# Patient Record
Sex: MALE | Race: WHITE | NOT HISPANIC OR LATINO | URBAN - METROPOLITAN AREA
[De-identification: names, ages, dates, MRNs, and addresses within clinical notes are randomized per-mention and may not be internally consistent; named-entity substitution may affect disease eponyms.]

---

## 2020-12-16 ENCOUNTER — IMPORTED ENCOUNTER (OUTPATIENT)
Dept: URBAN - METROPOLITAN AREA CLINIC 50 | Facility: CLINIC | Age: 35
End: 2020-12-16

## 2021-01-19 ENCOUNTER — IMPORTED ENCOUNTER (OUTPATIENT)
Dept: URBAN - METROPOLITAN AREA CLINIC 50 | Facility: CLINIC | Age: 36
End: 2021-01-19

## 2021-02-23 ENCOUNTER — IMPORTED ENCOUNTER (OUTPATIENT)
Dept: URBAN - METROPOLITAN AREA CLINIC 50 | Facility: CLINIC | Age: 36
End: 2021-02-23

## 2021-04-18 ASSESSMENT — VISUAL ACUITY
OS_CC: 20/25 PUSH
OD_CC: 20/20
OD_CC: 20/25 PUSH
OS_CC: 20/20
OD_CC: J1+
OS_CC: J1+@ 14 IN
OD_CC: J1+@ 14 IN
OS_CC: J1+

## 2021-04-18 ASSESSMENT — TONOMETRY
OS_IOP_MMHG: 15
OD_IOP_MMHG: 14

## 2021-10-21 ENCOUNTER — PREPPED CHART (OUTPATIENT)
Dept: URBAN - METROPOLITAN AREA CLINIC 53 | Facility: CLINIC | Age: 36
End: 2021-10-21

## 2021-10-28 ENCOUNTER — CONTACT LENS FITTING (OUTPATIENT)
Dept: URBAN - METROPOLITAN AREA CLINIC 53 | Facility: CLINIC | Age: 36
End: 2021-10-28

## 2021-10-28 DIAGNOSIS — H52.13: ICD-10-CM

## 2021-10-28 PROCEDURE — CLF EW NO CL FIT, EW, NO ADJUSTMENT

## 2022-01-10 ENCOUNTER — ESTABLISHED PATIENT (OUTPATIENT)
Dept: URBAN - METROPOLITAN AREA CLINIC 53 | Facility: CLINIC | Age: 37
End: 2022-01-10

## 2022-01-10 DIAGNOSIS — E10.3292: ICD-10-CM

## 2022-01-10 DIAGNOSIS — Z01.01: ICD-10-CM

## 2022-01-10 DIAGNOSIS — H52.13: ICD-10-CM

## 2022-01-10 PROCEDURE — 92014 COMPRE OPH EXAM EST PT 1/>: CPT

## 2022-01-10 PROCEDURE — 92015 DETERMINE REFRACTIVE STATE: CPT

## 2022-01-10 PROCEDURE — 92134 CPTRZ OPH DX IMG PST SGM RTA: CPT

## 2022-01-10 ASSESSMENT — TONOMETRY
OS_IOP_MMHG: 15
OD_IOP_MMHG: 16

## 2022-01-10 ASSESSMENT — VISUAL ACUITY
OD_CC: 20/25-2/+2
OU_CC: 20/20-2
OU_CC: J1+
OS_CC: 20/25-1

## 2024-11-11 ENCOUNTER — OFFICE VISIT (OUTPATIENT)
Dept: FAMILY MEDICINE CLINIC | Facility: CLINIC | Age: 39
End: 2024-11-11
Payer: COMMERCIAL

## 2024-11-11 VITALS
OXYGEN SATURATION: 97 % | WEIGHT: 254.8 LBS | TEMPERATURE: 97.5 F | SYSTOLIC BLOOD PRESSURE: 108 MMHG | HEIGHT: 75 IN | DIASTOLIC BLOOD PRESSURE: 70 MMHG | HEART RATE: 76 BPM | BODY MASS INDEX: 31.68 KG/M2

## 2024-11-11 DIAGNOSIS — E78.01 FAMILIAL HYPERCHOLESTEROLEMIA: ICD-10-CM

## 2024-11-11 DIAGNOSIS — Z11.59 NEED FOR HEPATITIS C SCREENING TEST: ICD-10-CM

## 2024-11-11 DIAGNOSIS — Z00.00 ANNUAL PHYSICAL EXAM: ICD-10-CM

## 2024-11-11 DIAGNOSIS — F41.1 GENERALIZED ANXIETY DISORDER: ICD-10-CM

## 2024-11-11 DIAGNOSIS — E10.9 TYPE 1 DIABETES MELLITUS WITHOUT COMPLICATION (HCC): ICD-10-CM

## 2024-11-11 DIAGNOSIS — I10 ESSENTIAL HYPERTENSION: ICD-10-CM

## 2024-11-11 DIAGNOSIS — Z11.4 SCREENING FOR HIV (HUMAN IMMUNODEFICIENCY VIRUS): ICD-10-CM

## 2024-11-11 DIAGNOSIS — Z23 ENCOUNTER FOR IMMUNIZATION: Primary | ICD-10-CM

## 2024-11-11 DIAGNOSIS — Z76.89 ENCOUNTER TO ESTABLISH CARE: ICD-10-CM

## 2024-11-11 LAB
CREAT UR-MCNC: 195.5 MG/DL
LEFT EYE DIABETIC RETINOPATHY: NORMAL
LEFT EYE IMAGE QUALITY: NORMAL
LEFT EYE MACULAR EDEMA: NORMAL
LEFT EYE OTHER RETINOPATHY: NORMAL
MICROALBUMIN UR-MCNC: 9.2 MG/L
MICROALBUMIN/CREAT 24H UR: 5 MG/G CREATININE (ref 0–30)
RIGHT EYE DIABETIC RETINOPATHY: NORMAL
RIGHT EYE IMAGE QUALITY: NORMAL
RIGHT EYE MACULAR EDEMA: NORMAL
RIGHT EYE OTHER RETINOPATHY: NORMAL
SEVERITY (EYE EXAM): NORMAL
SL AMB POCT HEMOGLOBIN AIC: 8.3 (ref ?–6.5)

## 2024-11-11 PROCEDURE — 83036 HEMOGLOBIN GLYCOSYLATED A1C: CPT

## 2024-11-11 PROCEDURE — 82570 ASSAY OF URINE CREATININE: CPT

## 2024-11-11 PROCEDURE — 99385 PREV VISIT NEW AGE 18-39: CPT

## 2024-11-11 PROCEDURE — 99214 OFFICE O/P EST MOD 30 MIN: CPT

## 2024-11-11 PROCEDURE — 90471 IMMUNIZATION ADMIN: CPT

## 2024-11-11 PROCEDURE — 90656 IIV3 VACC NO PRSV 0.5 ML IM: CPT

## 2024-11-11 PROCEDURE — 82043 UR ALBUMIN QUANTITATIVE: CPT

## 2024-11-11 RX ORDER — INSULIN ASPART 100 [IU]/ML
3 INJECTION, SOLUTION INTRAVENOUS; SUBCUTANEOUS 2 TIMES DAILY PRN
Qty: 15 ML | Refills: 3 | Status: SHIPPED | OUTPATIENT
Start: 2024-11-11 | End: 2024-11-21 | Stop reason: SDUPTHER

## 2024-11-11 RX ORDER — ATORVASTATIN CALCIUM 40 MG/1
40 TABLET, FILM COATED ORAL DAILY
COMMUNITY
Start: 2017-08-10 | End: 2024-11-11 | Stop reason: SDUPTHER

## 2024-11-11 RX ORDER — LISINOPRIL 20 MG/1
20 TABLET ORAL DAILY
Qty: 90 TABLET | Refills: 3 | Status: SHIPPED | OUTPATIENT
Start: 2024-11-11 | End: 2024-11-21 | Stop reason: SDUPTHER

## 2024-11-11 RX ORDER — SERTRALINE HYDROCHLORIDE 100 MG/1
150 TABLET, FILM COATED ORAL DAILY
Qty: 90 TABLET | Refills: 3 | Status: SHIPPED | OUTPATIENT
Start: 2024-11-11 | End: 2024-11-21 | Stop reason: SDUPTHER

## 2024-11-11 RX ORDER — SERTRALINE HYDROCHLORIDE 100 MG/1
150 TABLET, FILM COATED ORAL DAILY
COMMUNITY
Start: 2008-05-10 | End: 2024-11-11 | Stop reason: SDUPTHER

## 2024-11-11 RX ORDER — INSULIN ASPART 100 [IU]/ML
INJECTION, SOLUTION INTRAVENOUS; SUBCUTANEOUS
COMMUNITY
Start: 2014-04-10 | End: 2024-11-15 | Stop reason: SDUPTHER

## 2024-11-11 RX ORDER — ASPIRIN 81 MG/1
81 TABLET, CHEWABLE ORAL ONCE
COMMUNITY
End: 2024-11-11 | Stop reason: SDUPTHER

## 2024-11-11 RX ORDER — ASPIRIN 81 MG/1
81 TABLET, CHEWABLE ORAL ONCE
Qty: 1 TABLET | Refills: 0 | Status: SHIPPED | OUTPATIENT
Start: 2024-11-11 | End: 2024-11-21 | Stop reason: SDUPTHER

## 2024-11-11 RX ORDER — INSULIN ASPART 100 [IU]/ML
INJECTION, SOLUTION INTRAVENOUS; SUBCUTANEOUS
COMMUNITY
Start: 2015-04-10 | End: 2024-11-11 | Stop reason: SDUPTHER

## 2024-11-11 RX ORDER — ATORVASTATIN CALCIUM 40 MG/1
40 TABLET, FILM COATED ORAL DAILY
Qty: 90 TABLET | Refills: 3 | Status: SHIPPED | OUTPATIENT
Start: 2024-11-11 | End: 2024-11-21 | Stop reason: SDUPTHER

## 2024-11-11 RX ORDER — LISINOPRIL 20 MG/1
20 TABLET ORAL DAILY
COMMUNITY
Start: 2024-10-04 | End: 2024-11-11 | Stop reason: SDUPTHER

## 2024-11-11 NOTE — ASSESSMENT & PLAN NOTE
-Refilled Lipitor 40mg daily  -Ordered lipid panel    Orders:    atorvastatin (LIPITOR) 40 mg tablet; Take 1 tablet (40 mg total) by mouth daily    Lipid panel; Future

## 2024-11-11 NOTE — ASSESSMENT & PLAN NOTE
-Last A1C a year ago was 9  -A1C today 8.3   -On insulin pump, has 2-3 weeks left. Placed STAT referral to endocrine and clinical pharmacy   -Refilled 100 units Novolog flex pen PRN   -Refilled ASA 81 mg daily   -DM foot exam, eye exam, and urine alb/creat done at todays visit   Lab Results   Component Value Date    HGBA1C 8.3 (A) 11/11/2024       Orders:    aspirin 81 mg chewable tablet; Chew 1 tablet (81 mg total) once for 1 dose    insulin aspart (NovoLOG FlexPen) 100 UNIT/ML injection pen; Inject 3 Units under the skin 2 (two) times a day as needed for high blood sugar    Ambulatory referral to clinical pharmacy; Future    Ambulatory Referral to Endocrinology; Future    IRIS Diabetic eye exam    POCT hemoglobin A1c    Albumin / creatinine urine ratio; Future    Albumin / creatinine urine ratio

## 2024-11-11 NOTE — PROGRESS NOTES
Diabetic Foot Exam    Patient's shoes and socks removed.    Right Foot/Ankle   Right Foot Inspection  Skin Exam: skin normal and skin intact. No dry skin, no warmth, no callus, no erythema, no maceration, no abnormal color, no pre-ulcer, no ulcer and no callus.     Toe Exam: ROM and strength within normal limits.     Sensory   Vibration: intact  Proprioception: intact  Monofilament testing: intact    Vascular  Capillary refills: < 3 seconds  The right DP pulse is 2+. The right PT pulse is 2+.     Right Toe  - Comprehensive Exam  Ecchymosis: none  Arch: normal  Hammertoes: absent  Claw Toes: absent  Swelling: none   Tenderness: none       Left Foot/Ankle  Left Foot Inspection  Skin Exam: skin normal and skin intact. No dry skin, no warmth, no erythema, no maceration, normal color, no pre-ulcer, no ulcer and no callus.     Toe Exam: ROM and strength within normal limits.     Sensory   Vibration: intact  Proprioception: intact  Monofilament testing: intact    Vascular  Capillary refills: < 3 seconds  The left DP pulse is 2+. The left PT pulse is 2+.     Left Toe  - Comprehensive Exam  Ecchymosis: none  Arch: normal  Hammertoes: absent  Claw toes: absent  Swelling: none   Tenderness: none       Assign Risk Category  No deformity present  No loss of protective sensation  No weak pulses  Risk: 0

## 2024-11-11 NOTE — PATIENT INSTRUCTIONS
"Patient Education     Routine physical for adults   The Basics   Written by the doctors and editors at [facility]   What is a physical? -- A physical is a routine visit, or \"check-up,\" with your doctor. You might also hear it called a \"wellness visit\" or \"preventive visit.\"  During each visit, the doctor will:   Ask about your physical and mental health   Ask about your habits, behaviors, and lifestyle   Do an exam   Give you vaccines if needed   Talk to you about any medicines you take   Give advice about your health   Answer your questions  Getting regular check-ups is an important part of taking care of your health. It can help your doctor find and treat any problems you have. But it's also important for preventing health problems.  A routine physical is different from a \"sick visit.\" A sick visit is when you see a doctor because of a health concern or problem. Since physicals are scheduled ahead of time, you can think about what you want to ask the doctor.  How often should I get a physical? -- It depends on your age and health. In general, for people age 21 years and older:   If you are younger than 50 years, you might be able to get a physical every 3 years.   If you are 50 years or older, your doctor might recommend a physical every year.  If you have an ongoing health condition, like diabetes or high blood pressure, your doctor will probably want to see you more often.  What happens during a physical? -- In general, each visit will include:   Physical exam - The doctor or nurse will check your height, weight, heart rate, and blood pressure. They will also look at your eyes and ears. They will ask about how you are feeling and whether you have any symptoms that bother you.   Medicines - It's a good idea to bring a list of all the medicines you take to each doctor visit. Your doctor will talk to you about your medicines and answer any questions. Tell them if you are having any side effects that bother you. You " "should also tell them if you are having trouble paying for any of your medicines.   Habits and behaviors - This includes:   Your diet   Your exercise habits   Whether you smoke, drink alcohol, or use drugs   Whether you are sexually active   Whether you feel safe at home  Your doctor will talk to you about things you can do to improve your health and lower your risk of health problems. They will also offer help and support. For example, if you want to quit smoking, they can give you advice and might prescribe medicines. If you want to improve your diet or get more physical activity, they can help you with this, too.   Lab tests, if needed - The tests you get will depend on your age and situation. For example, your doctor might want to check your:   Cholesterol   Blood sugar   Iron level   Vaccines - The recommended vaccines will depend on your age, health, and what vaccines you already had. Vaccines are very important because they can prevent certain serious or deadly infections.   Discussion of screening - \"Screening\" means checking for diseases or other health problems before they cause symptoms. Your doctor can recommend screening based on your age, risk, and preferences. This might include tests to check for:   Cancer, such as breast, prostate, cervical, ovarian, colorectal, prostate, lung, or skin cancer   Sexually transmitted infections, such as chlamydia and gonorrhea   Mental health conditions like depression and anxiety  Your doctor will talk to you about the different types of screening tests. They can help you decide which screenings to have. They can also explain what the results might mean.   Answering questions - The physical is a good time to ask the doctor or nurse questions about your health. If needed, they can refer you to other doctors or specialists, too.  Adults older than 65 years often need other care, too. As you get older, your doctor will talk to you about:   How to prevent falling at " home   Hearing or vision tests   Memory testing   How to take your medicines safely   Making sure that you have the help and support you need at home  All topics are updated as new evidence becomes available and our peer review process is complete.  This topic retrieved from [employer] on: May 02, 2024.  Topic 646243 Version 1.0  Release: 32.4.3 - C32.122  © 2024 UpToDate, Inc. and/or its affiliates. All rights reserved.  Consumer Information Use and Disclaimer   Disclaimer: This generalized information is a limited summary of diagnosis, treatment, and/or medication information. It is not meant to be comprehensive and should be used as a tool to help the user understand and/or assess potential diagnostic and treatment options. It does NOT include all information about conditions, treatments, medications, side effects, or risks that may apply to a specific patient. It is not intended to be medical advice or a substitute for the medical advice, diagnosis, or treatment of a health care provider based on the health care provider's examination and assessment of a patient's specific and unique circumstances. Patients must speak with a health care provider for complete information about their health, medical questions, and treatment options, including any risks or benefits regarding use of medications. This information does not endorse any treatments or medications as safe, effective, or approved for treating a specific patient. UpToDate, Inc. and its affiliates disclaim any warranty or liability relating to this information or the use thereof.The use of this information is governed by the Terms of Use, available at https://www.[employer]ingu[employer].com/en/know/clinical-effectiveness-terms. 2024© UpToDate, Inc. and its affiliates and/or licensors. All rights reserved.  Copyright   © 2024 UpToDate, Inc. and/or its affiliates. All rights reserved.

## 2024-11-11 NOTE — ASSESSMENT & PLAN NOTE
-Currently taking 150 mg Zoloft daily  -States anxiety is well controlled. No concerns at this visit   -Not interested in talk therapy at this time   Orders:    sertraline (ZOLOFT) 100 mg tablet; Take 1.5 tablets (150 mg total) by mouth daily

## 2024-11-11 NOTE — PROGRESS NOTES
Adult Annual Physical  Name: Leeroy Corral      : 1985      MRN: [id number]  Encounter Provider: Cynthia Fraser PA-C  Encounter Date: 2024   Encounter department: [facility] [address]    Assessment & Plan  Annual physical exam  -Moved back to area from [place]  -Needs med refill   -No concerns at this visit     Orders:    CBC and differential; Future    Comprehensive metabolic panel; Future    Type 1 diabetes mellitus without complication (HCC)  -Last A1C a year ago was 9  -A1C today 8.3   -On insulin pump, has 2-3 weeks left. Placed STAT referral to endocrine and clinical pharmacy   -Refilled 100 units Novolog flex pen PRN   -Refilled ASA 81 mg daily   -DM foot exam, eye exam, and urine alb/creat done at todays visit   Lab Results   Component Value Date    HGBA1C 8.3 (A) 2024       Orders:    aspirin 81 mg chewable tablet; Chew 1 tablet (81 mg total) once for 1 dose    insulin aspart (NovoLOG FlexPen) 100 UNIT/ML injection pen; Inject 3 Units under the skin 2 (two) times a day as needed for high blood sugar    Ambulatory referral to clinical pharmacy; Future    Ambulatory Referral to Endocrinology; Future    IRIS Diabetic eye exam    POCT hemoglobin A1c    Albumin / creatinine urine ratio; Future    Albumin / creatinine urine ratio    Essential hypertension  -Refilled 20 mg lisinopril  -BP in office 108/70     Orders:    lisinopril (ZESTRIL) 20 mg tablet; Take 1 tablet (20 mg total) by mouth daily    Generalized anxiety disorder  -Currently taking 150 mg Zoloft daily  -States anxiety is well controlled. No concerns at this visit   -Not interested in talk therapy at this time   Orders:    sertraline (ZOLOFT) 100 mg tablet; Take 1.5 tablets (150 mg total) by mouth daily    Familial hypercholesterolemia  -Refilled Lipitor 40mg daily  -Ordered lipid panel    Orders:    atorvastatin (LIPITOR) 40 mg tablet; Take 1 tablet (40 mg total) by mouth daily    Lipid  panel; Future    Encounter for immunization    Orders:    influenza vaccine preservative-free 0.5 mL IM (Fluzone, Afluria, Fluarix, Flulaval)    Encounter to establish care         Screening for HIV (human immunodeficiency virus)    Orders:    HIV 1/2 AG/AB w Reflex [facility] for 2 yr old and above; Future    Need for hepatitis C screening test    Orders:    Hepatitis C Antibody; Future      Immunizations and preventive care screenings were discussed with patient today. Appropriate education was printed on patient's after visit summary.    Counseling:  Alcohol/drug use: discussed moderation in alcohol intake, the recommendations for healthy alcohol use, and avoidance of illicit drug use.  Dental Health: discussed importance of regular tooth brushing, flossing, and dental visits.  Injury prevention: discussed safety/seat belts, safety helmets, smoke detectors, carbon monoxide detectors, and smoking near bedding or upholstery.  Sexual health: discussed sexually transmitted diseases, partner selection, use of condoms, avoidance of unintended pregnancy, and contraceptive alternatives.  Exercise: the importance of regular exercise/physical activity was discussed. Recommend exercise 3-5 times per week for at least 30 minutes.       Depression Screening and Follow-up Plan: Patient was screened for depression during today's encounter. They screened negative with a PHQ-2 score of 0.        History of Present Illness     Adult Annual Physical:  Patient presents for annual physical. He recently moved back from [place] .     Diet and Physical Activity:  - Diet/Nutrition: well balanced diet and frequent junk food. States he frequently has fast food for lunch  - Exercise: walking and 5-7 times a week on average.    Depression Screening:  - PHQ-2 Score: 0    General Health:  - Sleep: sleeps well. 6-8 hours per night  - Hearing: normal hearing bilateral ears.  - Vision: wears glasses and contacts and most recent eye exam > 1 year ago.  "Scheduling with [facility]  - Dental: no dental visits for > 1 year and brushes teeth twice daily. Flosses intermittently. Scheduling appt with Dr Meyer today     Health:  - History of STDs: no.   - Urinary symptoms: none.     Advanced Care Planning:  - Has an advanced directive?: no    - Has a durable medical POA?: no    - ACP document given to patient?: yes          Review of Systems   Constitutional:  Negative for chills, fatigue and fever.   HENT:  Negative for congestion, ear pain, rhinorrhea, sinus pain and sore throat.    Eyes:  Negative for pain.   Respiratory:  Negative for cough and shortness of breath.    Cardiovascular:  Negative for chest pain and leg swelling.   Gastrointestinal:  Negative for abdominal pain, constipation, diarrhea, nausea and vomiting.   Genitourinary:  Negative for difficulty urinating, dysuria, frequency and urgency.   Musculoskeletal:  Negative for neck pain.   Skin:  Negative for rash.   Neurological:  Negative for dizziness and headaches.   Psychiatric/Behavioral:  Negative for sleep disturbance.              Objective     /70   Pulse 76   Temp 97.5 °F (36.4 °C) (Tympanic)   Ht 6' 3\" (1.905 m)   Wt 116 kg (254 lb 12.8 oz)   SpO2 97%   BMI 31.85 kg/m²     Physical Exam  Vitals reviewed.   Constitutional:       General: He is not in acute distress.     Appearance: Normal appearance.   HENT:      Head: Normocephalic and atraumatic.      Right Ear: Tympanic membrane, ear canal and external ear normal.      Left Ear: Tympanic membrane, ear canal and external ear normal.      Nose: Nose normal.      Mouth/Throat:      Mouth: Mucous membranes are moist.   Eyes:      Extraocular Movements: Extraocular movements intact.      Conjunctiva/sclera: Conjunctivae normal.   Cardiovascular:      Rate and Rhythm: Normal rate and regular rhythm.      Pulses: no weak pulses.           Dorsalis pedis pulses are 2+ on the right side and 2+ on the left side.        Posterior " tibial pulses are 2+ on the right side and 2+ on the left side.      Heart sounds: Normal heart sounds.   Pulmonary:      Effort: Pulmonary effort is normal.      Breath sounds: Normal breath sounds. No wheezing, rhonchi or rales.   Abdominal:      General: Bowel sounds are normal. There is no distension.      Palpations: Abdomen is soft.      Tenderness: There is no abdominal tenderness.   Musculoskeletal:      Cervical back: Neck supple.      Right lower leg: No edema.      Left lower leg: No edema.   Feet:      Right foot:      Skin integrity: No ulcer, skin breakdown, erythema, warmth, callus or dry skin.      Left foot:      Skin integrity: No ulcer, skin breakdown, erythema, warmth, callus or dry skin.   Lymphadenopathy:      Cervical: No cervical adenopathy.   Skin:     General: Skin is warm.      Capillary Refill: Capillary refill takes less than 2 seconds.      Findings: No rash.   Neurological:      Mental Status: He is alert. Mental status is at baseline.   Psychiatric:         Mood and Affect: Mood normal.         Behavior: Behavior normal.     Diabetic Foot Exam    Patient's shoes and socks removed.    Right Foot/Ankle   Right Foot Inspection  Skin Exam: skin normal and skin intact. No dry skin, no warmth, no callus, no erythema, no maceration, no abnormal color, no pre-ulcer, no ulcer and no callus.     Toe Exam: ROM and strength within normal limits.     Sensory   Vibration: intact  Proprioception: intact  Monofilament testing: intact    Vascular  Capillary refills: < 3 seconds  The right DP pulse is 2+. The right PT pulse is 2+.     Left Foot/Ankle  Left Foot Inspection  Skin Exam: skin normal and skin intact. No dry skin, no warmth, no erythema, no maceration, normal color, no pre-ulcer, no ulcer and no callus.     Toe Exam: ROM and strength within normal limits.     Sensory   Vibration: intact  Proprioception: intact  Monofilament testing: intact    Vascular  Capillary refills: < 3 seconds  The  left DP pulse is 2+. The left PT pulse is 2+.     Assign Risk Category  No deformity present  No loss of protective sensation  No weak pulses  Risk: 0              Cynthia Fraser PA-C  [facility]  11/11/2024 10:14 AM     Additional Notes: nodulocystic and scarring acne that is improving after 2 months of isotretinoin but not at treatment goal and complicated by worsening headaches when dose increased from 40 mg daily to 40 mg BID. headaches resolved when decreased back down to 40 mg daily\\n\\nwill increase to 60 mg daily via alternate day dosing (he has about 2 weeks of medication remaining from last month due to dose adjustment). will plan to check in via short phone call in 2 weeks to see how tolerating and instructed father to contact me (he has my cell phone number) if headaches return with dose increase. if tolerating 60 mg daily, will plan to continue at that dose for the remainder of the treatment course\\n\\nprescription in January called into [facility] pharmacy as mother works there and other commercial pharmacy was cost-prohibitive. can use [facility] pharmacy in future or send to [facility] if needed Additional Notes: nodulocystic and scarring acne that is improving after 2 months of isotretinoin but not at treatment goal and complicated by worsening headaches when dose increased from 40 mg daily to 40 mg BID. headaches resolved when decreased back down to 40 mg daily\\n\\nwill increase to 60 mg daily via alternate day dosing (he has about 2 weeks of medication remaining from last month due to dose adjustment). will plan to check in via short phone call in 2 weeks to see how tolerating and instructed father to contact me (he has my cell phone number) if headaches return with dose increase. if tolerating 60 mg daily, will plan to continue at that dose for the remainder of the treatment course\\n\\nprescription in January called into [facility] pharmacy as mother works there and other commercial pharmacy was cost-prohibitive. can use Replaced by [facility] pharmacy in future or send to [facility] if needed

## 2024-11-11 NOTE — ASSESSMENT & PLAN NOTE
-Refilled 20 mg lisinopril  -BP in office 108/70     Orders:    lisinopril (ZESTRIL) 20 mg tablet; Take 1 tablet (20 mg total) by mouth daily

## 2024-11-12 ENCOUNTER — TELEPHONE (OUTPATIENT)
Dept: FAMILY MEDICINE CLINIC | Facility: CLINIC | Age: 39
End: 2024-11-12

## 2024-11-12 NOTE — TELEPHONE ENCOUNTER
Please contact patient to schedule Clinical Pharmacist Appointment     Reason for appointment: diabetes  When to schedule with Pharmacist: as soon as available  What should the patient bring to the appointment: med list and glucose log    Appointment Department:  [facility]  Pharmacist patient will be seeing: Aysha Story  Visit Type Preference (ie Phone, Video, In-person): VIRTUAL  In-person visits will be at: [facility]  Virtual visits will be completed via [employer] or Phone - please clarify patient preference in appointment notes    Please respond to this note to keep track of the number of patient outreaches.     Please try to reach out to patient on 2 separate days

## 2024-11-12 NOTE — TELEPHONE ENCOUNTER
Pt returned call back, scheduled.       NEXT APPT  With Family Medicine (Liz Deleon, Pharmacist)  11/15/2024 at 10:00 AM

## 2024-11-15 ENCOUNTER — CLINICAL SUPPORT (OUTPATIENT)
Dept: FAMILY MEDICINE CLINIC | Facility: CLINIC | Age: 39
End: 2024-11-15

## 2024-11-15 DIAGNOSIS — E78.01 FAMILIAL HYPERCHOLESTEROLEMIA: ICD-10-CM

## 2024-11-15 DIAGNOSIS — E10.9 TYPE 1 DIABETES MELLITUS WITHOUT COMPLICATION (HCC): Primary | ICD-10-CM

## 2024-11-15 DIAGNOSIS — I10 ESSENTIAL HYPERTENSION: ICD-10-CM

## 2024-11-15 PROCEDURE — PBNCHG PB NO CHARGE PLACEHOLDER: Performed by: PHARMACIST

## 2024-11-15 RX ORDER — INSULIN ASPART 100 [IU]/ML
INJECTION, SOLUTION INTRAVENOUS; SUBCUTANEOUS
Qty: 90 ML | Refills: 1 | Status: SHIPPED | OUTPATIENT
Start: 2024-11-15

## 2024-11-15 NOTE — Clinical Note
I spoke with Francisco J today. I sent refills on his Novolog vials to be used with his pump and test strips. We should be getting a form faxed to the office for refills on his pump/CGM supplies but he has a few months of those at home so less urgent. He does have appt with endo scheduled but the soonest they could get him in was end of Feb so we will need to order his supplies to bridge that gap. Let me know if we get the form from [employer] and I can help with filling it out.

## 2024-11-15 NOTE — PROGRESS NOTES
[facility] Clinical Pharmacy Services  Liz Deleon, Pharmacist    Assessment/ Plan     Assessment & Plan  Type 1 diabetes mellitus without complication (HCC)    Lab Results   Component Value Date    HGBA1C 8.3 (A) 11/11/2024       Insulin pump system: Medtronics MiniMed 780G System  Insulin used in pump: Novolog vial U-100 (3 vials ~28 day supply)  CGM: [employer] Guardian   Fingerstick glucometer: Accuchek Guide     Pump settings:  Carb ratio: 4 grams per unit  IS: 20 mg/dL/ unit   7 day average total daily insulin amount: 105 units     Assessment:  Patient moved back to [place] from [place] and is establishing care with PCP and Endo in [place].  He had a visit with his primary care provider Cynthia Fraser PA-C on November 11.  He has an appointment scheduled with endo for earliest date of Feb 2025 but he is on a wait list for sooner appointment. At this time he needs Novolog vials for use with his pump sent to his pharmacy.     He thinks he has enough pump supplies to get him through until his Endo appointment.  He has some CGM supplies but will likely need a refill before Endo.  He gets his supplies filled through [employer] and they are supposed to be faxing a form to our office for insulin pump and CGM supply refills.     He needs refills for his test strips. He likes to have extra Novolog pens at home in case pump falls off early.       Changes to medication regimen:  Refill sent for Novolog vials and test strips  Will assist with filling out pump / CGM supply form when he receive it.      Orders:    Ambulatory referral to clinical pharmacy    glucose blood (Accu-Chek Guide) test strip; Use to test blood sugar up to 4 times daily. Accu-chek Guide test strips    Insulin Aspart (NovoLOG) 100 units/mL injection; For use with insulin pump. Total daily dose 105 units.      Subjective   HPI    Medication Adherence/ Tolerability/ Cost:  atorvastatin  insulin aspart  Pens- uses prn if pump falls off  Vials-  uses with Medtronic pump  lisinopril  sertraline    2. Home monitoring devices  Glucometer: Yes, Brand: Accu-chek guide  Continuous Glucose Monitor: Yes, Brand: Medtronic guardian     Objective       Blood Sugar Readings  Had medtronic Guardian CGM    Glucose Range   Lowest: 63 mg/dL  Highest: 270 mg/dL   Average: 141 mg/dL  Time in target: 74%    ASCVD Risk:  The ASCVD Risk score (Jet BISHOP, et al., 2019) failed to calculate for the following reasons:    The 2019 ASCVD risk score is only valid for ages 40 to 79     Vitals:  There were no vitals filed for this visit.    Eye Exam:    Lab Results   Component Value Date    LEFTDIABRET None 11/11/2024    RIGHTDIABRET None 11/11/2024       Labs:  Lab Results   Component Value Date    MICROALBCRE 5 11/11/2024       Lab Results   Component Value Date    HGBA1C 8.3 (A) 11/11/2024       Telemedicine consent  The patient was identified by name and date of birth. Leeroy Corral was informed that this is a telemedicine visit and that the visit is being conducted through the Epic Embedded platform. He agrees to proceed..  My office door was closed. No one else was in the room.  He acknowledged consent and understanding of privacy and security of the video platform. The patient has agreed to participate and understands they can discontinue the visit at any time.    Pharmacist Tracking Tool     Pharmacist Tracking Tool  Reason For Outreach: Embedded Pharmacist  Demographics:  Intervention Method: Video  Type of Intervention: New  Topics Addressed: Diabetes  Pharmacologic Interventions: Med Rec  Non-Pharmacologic Interventions: N/A  Time:  Direct Patient Care:  30  mins  Care Coordination:  20  mins  Recommendation Recipient: Patient/Caregiver and Provider  Outcome: Accepted

## 2024-11-15 NOTE — ASSESSMENT & PLAN NOTE
Lab Results   Component Value Date    HGBA1C 8.3 (A) 11/11/2024       Insulin pump system: [employer]blar MiniMed 780G System  Insulin used in pump: Novolog vial U-100 (3 vials ~28 day supply)  CGM: [employer]   Fingerstick glucometer: [employer]hek Guide     Pump settings:  Carb ratio: 4 grams per unit  IS: 20 mg/dL/ unit   7 day average total daily insulin amount: 105 units     Assessment:  Patient moved back to [place] from [place] and is establishing care with PCP and Endo in [place].  He had a visit with his primary care provider Cynthia Fraser PA-C on November 11.  He has an appointment scheduled with endo for earliest date of Feb 2025 but he is on a wait list for sooner appointment. At this time he needs Novolog vials for use with his pump sent to his pharmacy.     He thinks he has enough pump supplies to get him through until his Endo appointment.  He has some CGM supplies but will likely need a refill before Endo.  He gets his supplies filled through [employer] and they are supposed to be faxing a form to our office for insulin pump and CGM supply refills.     He needs refills for his test strips. He likes to have extra Novolog pens at home in case pump falls off early.       Changes to medication regimen:  Refill sent for Novolog vials and test strips  Will assist with filling out pump / CGM supply form when he receive it.      Orders:    Ambulatory referral to clinical pharmacy    glucose blood (Accu-Chek Guide) test strip; Use to test blood sugar up to 4 times daily. Accu-chek Guide test strips    Insulin Aspart (NovoLOG) 100 units/mL injection; For use with insulin pump. Total daily dose 105 units.

## 2024-11-21 ENCOUNTER — TELEPHONE (OUTPATIENT)
Dept: FAMILY MEDICINE CLINIC | Facility: CLINIC | Age: 39
End: 2024-11-21

## 2024-11-21 DIAGNOSIS — F41.1 GENERALIZED ANXIETY DISORDER: ICD-10-CM

## 2024-11-21 DIAGNOSIS — E10.9 TYPE 1 DIABETES MELLITUS WITHOUT COMPLICATION (HCC): ICD-10-CM

## 2024-11-21 DIAGNOSIS — I10 ESSENTIAL HYPERTENSION: ICD-10-CM

## 2024-11-21 DIAGNOSIS — E78.01 FAMILIAL HYPERCHOLESTEROLEMIA: ICD-10-CM

## 2024-11-21 RX ORDER — SERTRALINE HYDROCHLORIDE 100 MG/1
150 TABLET, FILM COATED ORAL DAILY
Qty: 90 TABLET | Refills: 3 | Status: SHIPPED | OUTPATIENT
Start: 2024-11-21

## 2024-11-21 RX ORDER — LISINOPRIL 20 MG/1
20 TABLET ORAL DAILY
Qty: 90 TABLET | Refills: 3 | Status: SHIPPED | OUTPATIENT
Start: 2024-11-21

## 2024-11-21 RX ORDER — INSULIN ASPART 100 [IU]/ML
3 INJECTION, SOLUTION INTRAVENOUS; SUBCUTANEOUS 2 TIMES DAILY PRN
Qty: 15 ML | Refills: 3 | Status: SHIPPED | OUTPATIENT
Start: 2024-11-21

## 2024-11-21 RX ORDER — ATORVASTATIN CALCIUM 40 MG/1
40 TABLET, FILM COATED ORAL DAILY
Qty: 90 TABLET | Refills: 3 | Status: SHIPPED | OUTPATIENT
Start: 2024-11-21

## 2024-11-21 RX ORDER — LISINOPRIL 20 MG/1
20 TABLET ORAL DAILY
Qty: 90 TABLET | Refills: 3 | Status: SHIPPED | OUTPATIENT
Start: 2024-11-21 | End: 2024-11-21 | Stop reason: SDUPTHER

## 2024-11-21 RX ORDER — ASPIRIN 81 MG/1
81 TABLET, CHEWABLE ORAL ONCE
Qty: 1 TABLET | Refills: 0 | Status: SHIPPED | OUTPATIENT
Start: 2024-11-21 | End: 2024-11-21

## 2024-11-21 RX ORDER — ASPIRIN 81 MG/1
81 TABLET, CHEWABLE ORAL ONCE
Qty: 1 TABLET | Refills: 0 | Status: SHIPPED | OUTPATIENT
Start: 2024-11-21 | End: 2024-11-21 | Stop reason: SDUPTHER

## 2024-11-21 RX ORDER — ATORVASTATIN CALCIUM 40 MG/1
40 TABLET, FILM COATED ORAL DAILY
Qty: 90 TABLET | Refills: 3 | Status: SHIPPED | OUTPATIENT
Start: 2024-11-21 | End: 2024-11-21 | Stop reason: SDUPTHER

## 2024-11-21 NOTE — TELEPHONE ENCOUNTER
"VM transcript \"Hi Liz, this is Leeroy SAUCEDO with my birthday April 17th, 1985. We had spoke yesterday or the day before about my insulin and supplies for my insulin pump. I was going to try to set up [facility] it I'm having a hard time with that, but the [facility] ones went through perfect. So I think we'll just stick with [facility]. We won't even bother with [facility] and I confirmed with my pump supplier [employer] that they are shipping that or they are updating that form. We'll send it over to you. So if you have any questions, please call me back. If not, thank you so much. I appreciate your help. Bye\"     Scripts sent to [facility] as requested per CPA agreement  "

## 2024-12-13 DIAGNOSIS — F41.1 GENERALIZED ANXIETY DISORDER: ICD-10-CM

## 2024-12-13 RX ORDER — SERTRALINE HYDROCHLORIDE 100 MG/1
TABLET, FILM COATED ORAL
Qty: 135 TABLET | Refills: 1 | Status: SHIPPED | OUTPATIENT
Start: 2024-12-13

## 2025-01-24 ENCOUNTER — TELEPHONE (OUTPATIENT)
Dept: FAMILY MEDICINE CLINIC | Facility: CLINIC | Age: 40
End: 2025-01-24

## 2025-01-24 NOTE — TELEPHONE ENCOUNTER
[employer] supplies called to make sure we received the paperwork that needs to be filled out and faxed back to them.    CB: [phone]

## 2025-01-29 ENCOUNTER — APPOINTMENT (OUTPATIENT)
Age: 40
End: 2025-01-29
Payer: COMMERCIAL

## 2025-01-29 DIAGNOSIS — E78.01 FAMILIAL HYPERCHOLESTEROLEMIA: ICD-10-CM

## 2025-01-29 DIAGNOSIS — Z11.59 NEED FOR HEPATITIS C SCREENING TEST: ICD-10-CM

## 2025-01-29 DIAGNOSIS — Z11.4 SCREENING FOR HIV (HUMAN IMMUNODEFICIENCY VIRUS): ICD-10-CM

## 2025-01-29 DIAGNOSIS — Z00.00 ANNUAL PHYSICAL EXAM: ICD-10-CM

## 2025-01-29 DIAGNOSIS — E10.9 TYPE 1 DIABETES MELLITUS WITHOUT COMPLICATION (HCC): ICD-10-CM

## 2025-01-29 LAB
ALBUMIN SERPL BCG-MCNC: 3.9 G/DL (ref 3.5–5)
ALP SERPL-CCNC: 101 U/L (ref 34–104)
ALT SERPL W P-5'-P-CCNC: 24 U/L (ref 7–52)
ANION GAP SERPL CALCULATED.3IONS-SCNC: 12 MMOL/L (ref 4–13)
AST SERPL W P-5'-P-CCNC: 23 U/L (ref 13–39)
BASOPHILS # BLD AUTO: 0.02 THOUSANDS/ΜL (ref 0–0.1)
BASOPHILS NFR BLD AUTO: 0 % (ref 0–1)
BILIRUB SERPL-MCNC: 0.45 MG/DL (ref 0.2–1)
BUN SERPL-MCNC: 12 MG/DL (ref 5–25)
CALCIUM SERPL-MCNC: 8.9 MG/DL (ref 8.4–10.2)
CHLORIDE SERPL-SCNC: 105 MMOL/L (ref 96–108)
CHOLEST SERPL-MCNC: 106 MG/DL (ref ?–200)
CO2 SERPL-SCNC: 24 MMOL/L (ref 21–32)
CREAT SERPL-MCNC: 0.76 MG/DL (ref 0.6–1.3)
EOSINOPHIL # BLD AUTO: 0.19 THOUSAND/ΜL (ref 0–0.61)
EOSINOPHIL NFR BLD AUTO: 3 % (ref 0–6)
ERYTHROCYTE [DISTWIDTH] IN BLOOD BY AUTOMATED COUNT: 12.2 % (ref 11.6–15.1)
EST. AVERAGE GLUCOSE BLD GHB EST-MCNC: 206 MG/DL
GFR SERPL CREATININE-BSD FRML MDRD: 115 ML/MIN/1.73SQ M
GLUCOSE P FAST SERPL-MCNC: 103 MG/DL (ref 65–99)
HBA1C MFR BLD: 8.8 %
HCT VFR BLD AUTO: 40.7 % (ref 36.5–49.3)
HCV AB SER QL: NORMAL
HDLC SERPL-MCNC: 35 MG/DL
HGB BLD-MCNC: 13.5 G/DL (ref 12–17)
HIV 1+2 AB+HIV1 P24 AG SERPL QL IA: NORMAL
HIV 2 AB SERPL QL IA: NORMAL
HIV1 AB SERPL QL IA: NORMAL
HIV1 P24 AG SERPL QL IA: NORMAL
IMM GRANULOCYTES # BLD AUTO: 0.02 THOUSAND/UL (ref 0–0.2)
IMM GRANULOCYTES NFR BLD AUTO: 0 % (ref 0–2)
LDLC SERPL CALC-MCNC: 53 MG/DL (ref 0–100)
LYMPHOCYTES # BLD AUTO: 2.15 THOUSANDS/ΜL (ref 0.6–4.47)
LYMPHOCYTES NFR BLD AUTO: 35 % (ref 14–44)
MCH RBC QN AUTO: 28.2 PG (ref 26.8–34.3)
MCHC RBC AUTO-ENTMCNC: 33.2 G/DL (ref 31.4–37.4)
MCV RBC AUTO: 85 FL (ref 82–98)
MONOCYTES # BLD AUTO: 0.67 THOUSAND/ΜL (ref 0.17–1.22)
MONOCYTES NFR BLD AUTO: 11 % (ref 4–12)
NEUTROPHILS # BLD AUTO: 3.1 THOUSANDS/ΜL (ref 1.85–7.62)
NEUTS SEG NFR BLD AUTO: 51 % (ref 43–75)
NONHDLC SERPL-MCNC: 71 MG/DL
NRBC BLD AUTO-RTO: 0 /100 WBCS
PLATELET # BLD AUTO: 168 THOUSANDS/UL (ref 149–390)
PMV BLD AUTO: 12.6 FL (ref 8.9–12.7)
POTASSIUM SERPL-SCNC: 4 MMOL/L (ref 3.5–5.3)
PROT SERPL-MCNC: 6.5 G/DL (ref 6.4–8.4)
RBC # BLD AUTO: 4.79 MILLION/UL (ref 3.88–5.62)
SODIUM SERPL-SCNC: 141 MMOL/L (ref 135–147)
TRIGL SERPL-MCNC: 90 MG/DL (ref ?–150)
WBC # BLD AUTO: 6.15 THOUSAND/UL (ref 4.31–10.16)

## 2025-01-29 PROCEDURE — 83036 HEMOGLOBIN GLYCOSYLATED A1C: CPT

## 2025-01-29 PROCEDURE — 80053 COMPREHEN METABOLIC PANEL: CPT

## 2025-01-29 PROCEDURE — 80061 LIPID PANEL: CPT

## 2025-01-29 PROCEDURE — 87389 HIV-1 AG W/HIV-1&-2 AB AG IA: CPT

## 2025-01-29 PROCEDURE — 86803 HEPATITIS C AB TEST: CPT

## 2025-01-29 PROCEDURE — 36415 COLL VENOUS BLD VENIPUNCTURE: CPT

## 2025-01-29 PROCEDURE — 85025 COMPLETE CBC W/AUTO DIFF WBC: CPT

## 2025-01-30 ENCOUNTER — RESULTS FOLLOW-UP (OUTPATIENT)
Dept: FAMILY MEDICINE CLINIC | Facility: CLINIC | Age: 40
End: 2025-01-30

## 2025-01-31 NOTE — PROGRESS NOTES
Name: Leeroy Corral      : 1985      MRN: [id number]  Encounter Provider: ANNITA Sarabia  Encounter Date: 2/3/2025   Encounter department: [facility]  :  Assessment & Plan  Type 1 diabetes mellitus without complication (HCC)  A1c uncontrolled and above goal of less than 7%.    Due to difficulty obtaining supplies, he has been using sensors every other week to ration supply.  Primary care has filled out DME paperwork and he hopefully should be receiving more supplies soon.  His A1c will improve with the use of closed-loop system.    We tightened carb ratio based on total daily dose.  0000: 5 -->4  0600: 4 --> 3.5  1200: 3.5  1600: 5 --> 4  Continue ISF 1: 20 and can consider tightening further at his next visit.  We changed active insulin time to 2 hours.    Discussed use of metformin to decrease total daily dose.  Report he was on this historically with poor effect.  Can also consider GLP-1 agonists and will discuss further at his next visit.    He has backup insulin in event of pump failure.  Reviewed sick day rules including checking ketones and administering correctional insulin.    Counseled on the long-term effects of hyperglycemia and uncontrolled diabetes including risk for heart attack, stroke, kidney failure, diabetic foot ulcers, limb loss, blindness, and death.    Reviewed risks as well as signs and symptoms of hypoglycemia.  Rule of 15's provided in AVS for appropriate treatment.  Call the office for blood glucose levels less than 70 mg/dL persistent patterns over 250 mg/dL.      Continue to improve diet and lifestyle including attention to the amount and type of carbohydrates consumed as well as increased physical activity as tolerated.     Follow-up in 3 months.  Labs prior to next visit.  Lab Results   Component Value Date    HGBA1C 8.8 (H) 2025     Orders:  •  Ambulatory Referral to Endocrinology  •  Hemoglobin A1C;  Future  •  TSH, 3rd generation with Free T4 reflex; Future  •  Basic metabolic panel; Future  •  Vitamin D 25 hydroxy; Future  •  Insulin Aspart (NovoLOG) 100 units/mL injection; For use with insulin pump.  Max daily dose 125 units.    Primary hypertension  /68 in the office today.  Continue lisinopril 20 mg daily.       Familial hypercholesterolemia  Continue atorvastatin 40 mg daily.           History of Present Illness {?Quick Links Encounters * My Last Note * Last Note in Specialty * Snapshot * Since Last Visit * History :44765}  JULIO C Corral is a 39 y.o. male who presents to the office today to establish care for type 1 diabetes.  He was initially diagnosed at 9 years old .  Diabetes course has been stable; denies hospitalizations for diabetes. Complications of diabetes include: denies.  Family history of diabetes includes: Multiple members-see family history section.  He was previously following with Endocrinology in [place] however has not been seen for over 1 year due to frequently canceled appointments by provider.  His primary care provider has been managing his diabetes in the interim. His most recent A1c is 8.8%.    Infrequent recent episodes of hypoglycemia.  Symptoms of hypoglycemia include: shaky, lack of energy, cold sweats    Patient is on a Insulin Pump Type: [employer]onic 780G  pump since 7/2024.  Current Problems with Pump: Denies    Current Insulin pump settings:  See Scanned Pump Downloads.  Basal Rate:    0000: 3.3   0500: 3.5   1100: 3.5  Insulin to carb ratio:   0000: 5   0600: 4   1200: 3.5   1600: 5  Insulin sensitivity factor:   0000: 20  BG target: 100 mg/dL  Type of insulin: NovoLog  Active Insulin Time: 4 hours    Use of Glucagon: No    Discussed with patient in case of malfunctioning of the pump to use basal and bolus therapy as backup which is prescribed to the patient. Also notified patient to call clinic if any issues.     CGM REVIEW:  Device used : Guardian 4, Home use    Indication: Type 1 Diabetes  Date Range: 1/22/2025 - 2/4/2025  More than 72 hours of data was reviewed. Report to be scanned to chart.     Analysis of data:   Average Glucose: 143 mg/dL  Coefficient of Variation: 33.8%  SD : 48 mg/dL  Time in Target Range: 76%  Time Above Range: 20% high; 3% very high  Time Below Range: 1% low; 0% very low    Interpretation of data: Blood glucose levels suboptimally controlled.  Episodes of severe postprandial hyperglycemia noted.  Occasional episodes of hypoglycemia which appear to be due to overcorrection for meals.      Diabetic Eye Exam: Needs to schedule, [facility]  Follows with Podiatry: Needs to schedule  Has Thyroid Disorder: No  Hypertension, taking: Lisinopril 20 mg daily  Hyperlipidemia, taking: Atorvastatin 40 mg daily, Tolerating well with no myalgias  History of Pancreatitis: No  Medic Alert Tag: Recommended  Diabetes Education:  Attended    History obtained from: patient    Review of Systems   Constitutional:  Negative for appetite change, fatigue and unexpected weight change.   HENT:  Negative for congestion, hearing loss and sore throat.    Respiratory:  Negative for cough.    Cardiovascular:  Negative for chest pain and palpitations.   Gastrointestinal:  Negative for abdominal pain, constipation, diarrhea, nausea and vomiting.   Endocrine: Negative for polydipsia and polyuria.   Musculoskeletal:  Negative for myalgias.   Skin:  Negative for wound.   Neurological:  Negative for weakness and numbness.   Psychiatric/Behavioral:  Negative for sleep disturbance.      Current Outpatient Medications on File Prior to Visit   Medication Sig Dispense Refill   • aspirin 81 mg chewable tablet Chew 1 tablet (81 mg total) once for 1 dose 1 tablet 0   • atorvastatin (LIPITOR) 40 mg tablet Take 1 tablet (40 mg total) by mouth daily 90 tablet 3   • glucose blood (Accu-Chek Guide) test strip Use to test blood sugar up to 4 times daily. Accu-chek Guide test strips 400 strip 1   •  "insulin aspart (NovoLOG FlexPen) 100 UNIT/ML injection pen Inject 3 Units under the skin 2 (two) times a day as needed for high blood sugar 15 mL 3   • lisinopril (ZESTRIL) 20 mg tablet Take 1 tablet (20 mg total) by mouth daily 90 tablet 3   • sertraline (ZOLOFT) 100 mg tablet TAKE 1.5 TABLETS (150MG TOTAL) BY MOUTH DAILY 135 tablet 1   • [DISCONTINUED] Insulin Aspart (NovoLOG) 100 units/mL injection For use with insulin pump. Total daily dose 105 units. 90 mL 1     No current facility-administered medications on file prior to visit.      Social History     Tobacco Use   • Smoking status: Never   • Smokeless tobacco: Never   Vaping Use   • Vaping status: Never Used   Substance and Sexual Activity   • Alcohol use: Not Currently     Alcohol/week: 2.0 standard drinks of alcohol     Types: 2 Cans of beer per week   • Drug use: Never   • Sexual activity: Yes     Partners: Female     Birth control/protection: Male Sterilization     Comment: Vasectomy 9/2024        Objective {?Quick Links Trend Vitals * Enter New Vitals * Results Review * Timeline (Adult) * Labs * Imaging * Cardiology * Procedures * Lung Cancer Screening * Surgical eConsent :42964}  /68 (BP Location: Right arm, Patient Position: Sitting, Cuff Size: Standard)   Pulse 63   Temp 98.5 °F (36.9 °C)   Ht 6' 3\" (1.905 m)   Wt 118 kg (260 lb)   SpO2 96%   BMI 32.50 kg/m²      Physical Exam  Vitals reviewed.   Constitutional:       General: He is not in acute distress.     Appearance: Normal appearance. He is well-groomed. He is obese.   HENT:      Head: Normocephalic and atraumatic.      Mouth/Throat:      Mouth: Mucous membranes are moist.   Eyes:      Conjunctiva/sclera: Conjunctivae normal.   Cardiovascular:      Rate and Rhythm: Normal rate.   Pulmonary:      Effort: Pulmonary effort is normal. No respiratory distress.   Abdominal:      General: There is no distension.      Palpations: Abdomen is soft.   Musculoskeletal:         General: No " swelling.      Cervical back: Normal range of motion.   Skin:     General: Skin is warm and dry.   Neurological:      Mental Status: He is alert and oriented to person, place, and time.   Psychiatric:         Mood and Affect: Mood normal.         Behavior: Behavior normal. Behavior is cooperative.         Thought Content: Thought content normal.         Judgment: Judgment normal.         Administrative Statements {?Quick Links Full Problem List * Level of Service * [facility]:[id number]}  I have spent a total time of 40 minutes in caring for this patient on the day of the visit/encounter including Diagnostic results, Prognosis, Risks and benefits of tx options, Instructions for management, Patient and family education, Importance of tx compliance, Risk factor reductions, Impressions, Counseling / Coordination of care, Documenting in the medical record, Reviewing / ordering tests, medicine, procedures  , and Obtaining or reviewing history  .

## 2025-02-03 ENCOUNTER — CONSULT (OUTPATIENT)
Age: 40
End: 2025-02-03
Payer: COMMERCIAL

## 2025-02-03 VITALS
HEART RATE: 63 BPM | TEMPERATURE: 98.5 F | HEIGHT: 75 IN | SYSTOLIC BLOOD PRESSURE: 120 MMHG | OXYGEN SATURATION: 96 % | BODY MASS INDEX: 32.33 KG/M2 | DIASTOLIC BLOOD PRESSURE: 68 MMHG | WEIGHT: 260 LBS

## 2025-02-03 DIAGNOSIS — I10 PRIMARY HYPERTENSION: ICD-10-CM

## 2025-02-03 DIAGNOSIS — E10.9 TYPE 1 DIABETES MELLITUS WITHOUT COMPLICATION (HCC): Primary | ICD-10-CM

## 2025-02-03 DIAGNOSIS — E78.01 FAMILIAL HYPERCHOLESTEROLEMIA: ICD-10-CM

## 2025-02-03 PROCEDURE — 95251 CONT GLUC MNTR ANALYSIS I&R: CPT

## 2025-02-03 PROCEDURE — 99244 OFF/OP CNSLTJ NEW/EST MOD 40: CPT

## 2025-02-03 RX ORDER — INSULIN ASPART 100 [IU]/ML
INJECTION, SOLUTION INTRAVENOUS; SUBCUTANEOUS
Qty: 120 ML | Refills: 1 | Status: SHIPPED | OUTPATIENT
Start: 2025-02-03

## 2025-02-03 NOTE — ASSESSMENT & PLAN NOTE
A1c uncontrolled and above goal of less than 7%.    Due to difficulty obtaining supplies, he has been using sensors every other week to ration supply.  Primary care has filled out DME paperwork and he hopefully should be receiving more supplies soon.  His A1c will improve with the use of closed-loop system.    We tightened carb ratio based on total daily dose.  0000: 5 -->4  0600: 4 --> 3.5  1200: 3.5  1600: 5 --> 4  Continue ISF 1: 20 and can consider tightening further at his next visit.  We changed active insulin time to 2 hours.    Discussed use of metformin to decrease total daily dose.  Report he was on this historically with poor effect.  Can also consider GLP-1 agonists and will discuss further at his next visit.    He has backup insulin in event of pump failure.  Reviewed sick day rules including checking ketones and administering correctional insulin.    Counseled on the long-term effects of hyperglycemia and uncontrolled diabetes including risk for heart attack, stroke, kidney failure, diabetic foot ulcers, limb loss, blindness, and death.    Reviewed risks as well as signs and symptoms of hypoglycemia.  Rule of 15's provided in AVS for appropriate treatment.  Call the office for blood glucose levels less than 70 mg/dL persistent patterns over 250 mg/dL.      Continue to improve diet and lifestyle including attention to the amount and type of carbohydrates consumed as well as increased physical activity as tolerated.     Follow-up in 3 months.  Labs prior to next visit.  Lab Results   Component Value Date    HGBA1C 8.8 (H) 01/29/2025     Orders:  •  Ambulatory Referral to Endocrinology  •  Hemoglobin A1C; Future  •  TSH, 3rd generation with Free T4 reflex; Future  •  Basic metabolic panel; Future  •  Vitamin D 25 hydroxy; Future  •  Insulin Aspart (NovoLOG) 100 units/mL injection; For use with insulin pump.  Max daily dose 125 units.

## 2025-02-03 NOTE — PATIENT INSTRUCTIONS
We changed ICR   0000: 5 -->4  0600: 4 --> 3.5  1200: 3.5  1600: 5 --> 4  We changed active insulin time to 2 hrs  Call the office for blood glucose levels less than 70 mg/dL or persistent patterns over 250 mg/dL.      Low Blood Sugar  Steps to treat low blood sugar.    1. Test blood sugar if you have symptoms of low blood sugar:   Low Blood Sugar Symptoms:  o Sweaty  o Dizzy  o Rapid heartbeat  o Shaky  o Bad mood  o Hungry    2. Treat blood sugar less than 70 with 15 grams of fast-acting carbohydrate:   Examples of 15 grams Fast-Acting Carbohydrate:  o 4 oz juice/ 4 oz regular soda  o 1 tablespoon honey  o 1 pack of fun size skittles (about 15 individual skittles)  o 12 gummy bears  o 4 starburst   o 3-4 hard candies (chew)  o 3-4 glucose tablets (chew)            3.   Wait 15 minutes and test your blood sugar again         4.   If blood sugar is less than 100, repeat steps 2-3.    5.   When your blood sugar is 100 or more, eat a snack if it will be longer than one hour until your next meal. The snack should be 15 grams of carbohydrate and a protein:   Examples of snacks:  o ½ sandwich  o 6 crackers with cheese or with peanut butter  o Piece of fruit with cheese or peanut butter

## 2025-02-06 ENCOUNTER — TELEPHONE (OUTPATIENT)
Age: 40
End: 2025-02-06

## 2025-02-06 NOTE — TELEPHONE ENCOUNTER
Melissa from [facility] received the additional paperwork she requested on the patient. She said she just wanted to let us know and there is no need to call back. Thank you.

## 2025-02-10 ENCOUNTER — OFFICE VISIT (OUTPATIENT)
Dept: FAMILY MEDICINE CLINIC | Facility: CLINIC | Age: 40
End: 2025-02-10
Payer: COMMERCIAL

## 2025-02-10 VITALS
OXYGEN SATURATION: 99 % | HEIGHT: 75 IN | SYSTOLIC BLOOD PRESSURE: 112 MMHG | BODY MASS INDEX: 31.71 KG/M2 | DIASTOLIC BLOOD PRESSURE: 76 MMHG | TEMPERATURE: 98.3 F | WEIGHT: 255 LBS | HEART RATE: 85 BPM | RESPIRATION RATE: 18 BRPM

## 2025-02-10 DIAGNOSIS — E10.22 TYPE 1 DM WITH CKD STAGE 1 AND HYPERTENSION  (HCC): Primary | ICD-10-CM

## 2025-02-10 DIAGNOSIS — I10 ESSENTIAL HYPERTENSION: ICD-10-CM

## 2025-02-10 DIAGNOSIS — E78.01 FAMILIAL HYPERCHOLESTEROLEMIA: ICD-10-CM

## 2025-02-10 DIAGNOSIS — I12.9 TYPE 1 DM WITH CKD STAGE 1 AND HYPERTENSION  (HCC): Primary | ICD-10-CM

## 2025-02-10 DIAGNOSIS — F41.1 GENERALIZED ANXIETY DISORDER: ICD-10-CM

## 2025-02-10 DIAGNOSIS — N18.1 TYPE 1 DM WITH CKD STAGE 1 AND HYPERTENSION  (HCC): Primary | ICD-10-CM

## 2025-02-10 PROCEDURE — 99214 OFFICE O/P EST MOD 30 MIN: CPT

## 2025-02-10 RX ORDER — LISINOPRIL 20 MG/1
20 TABLET ORAL DAILY
Qty: 90 TABLET | Refills: 3 | Status: SHIPPED | OUTPATIENT
Start: 2025-02-10

## 2025-02-10 RX ORDER — SERTRALINE HYDROCHLORIDE 100 MG/1
150 TABLET, FILM COATED ORAL DAILY
Qty: 135 TABLET | Refills: 1 | Status: SHIPPED | OUTPATIENT
Start: 2025-02-10

## 2025-02-10 RX ORDER — ATORVASTATIN CALCIUM 40 MG/1
40 TABLET, FILM COATED ORAL DAILY
Qty: 90 TABLET | Refills: 3 | Status: SHIPPED | OUTPATIENT
Start: 2025-02-10

## 2025-02-10 NOTE — ASSESSMENT & PLAN NOTE
Lab Results   Component Value Date    HGBA1C 8.8 (H) 01/29/2025   -Following with endo  -Notes activity level has been low due to the weather   -Waiting for sensors from [employer]

## 2025-02-10 NOTE — ASSESSMENT & PLAN NOTE
-Reports anxiety is stable  -Zoloft refill sent   Orders:    sertraline (ZOLOFT) 100 mg tablet; Take 1.5 tablets (150 mg total) by mouth daily

## 2025-02-10 NOTE — ASSESSMENT & PLAN NOTE
-Last lipid panel 1/29/2025 grossly wnl   -Refill of lipitor 40 mg sent   Orders:    atorvastatin (LIPITOR) 40 mg tablet; Take 1 tablet (40 mg total) by mouth daily

## 2025-02-10 NOTE — PROGRESS NOTES
Name: Leeroy Corral      : 1985      MRN: [id number]  Encounter Provider: Cynthia Fraser PA-C  Encounter Date: 2/10/2025   Encounter department: [facility] [address]  :  Assessment & Plan  Type 1 DM with CKD stage 1 and hypertension  (HCC)    Lab Results   Component Value Date    HGBA1C 8.8 (H) 2025   -Following with endo  -Notes activity level has been low due to the weather   -Waiting for sensors from [employer]            Essential hypertension  -/76  -Refill sent   Orders:    lisinopril (ZESTRIL) 20 mg tablet; Take 1 tablet (20 mg total) by mouth daily    Generalized anxiety disorder  -Reports anxiety is stable  -Zoloft refill sent   Orders:    sertraline (ZOLOFT) 100 mg tablet; Take 1.5 tablets (150 mg total) by mouth daily    Familial hypercholesterolemia  -Last lipid panel 2025 grossly wnl   -Refill of lipitor 40 mg sent   Orders:    atorvastatin (LIPITOR) 40 mg tablet; Take 1 tablet (40 mg total) by mouth daily           History of Present Illness     JULIO C Marx presents to the office for 3 month follow up. He notes A1C is increased due to decreased activity level.       Review of Systems   Constitutional:  Negative for chills, fatigue and fever.   HENT:  Negative for congestion, ear pain, rhinorrhea, sinus pain and sore throat.    Eyes:  Negative for pain.   Respiratory:  Negative for cough and shortness of breath.    Cardiovascular:  Negative for chest pain and leg swelling.   Gastrointestinal:  Negative for abdominal pain, constipation, diarrhea, nausea and vomiting.   Genitourinary:  Negative for difficulty urinating, dysuria, frequency and urgency.   Musculoskeletal:  Negative for neck pain.   Skin:  Negative for rash.   Neurological:  Negative for dizziness and headaches.   Psychiatric/Behavioral:  Negative for sleep disturbance.        Objective   /76 (BP Location: Left arm, Patient Position: Sitting, Cuff Size: Standard)    "Pulse 85   Temp 98.3 °F (36.8 °C) (Tympanic)   Resp 18   Ht 6' 3\" (1.905 m)   Wt 116 kg (255 lb)   SpO2 99%   BMI 31.87 kg/m²      Physical Exam  Vitals reviewed.   Constitutional:       General: He is not in acute distress.     Appearance: Normal appearance. He is obese.   HENT:      Head: Normocephalic and atraumatic.      Right Ear: Tympanic membrane, ear canal and external ear normal.      Left Ear: Tympanic membrane, ear canal and external ear normal.      Nose: Nose normal.      Mouth/Throat:      Mouth: Mucous membranes are moist.      Pharynx: Oropharynx is clear. No posterior oropharyngeal erythema or postnasal drip.   Eyes:      Extraocular Movements: Extraocular movements intact.      Conjunctiva/sclera: Conjunctivae normal.   Cardiovascular:      Rate and Rhythm: Normal rate and regular rhythm.      Heart sounds: Normal heart sounds.   Pulmonary:      Effort: Pulmonary effort is normal.      Breath sounds: Normal breath sounds. No wheezing, rhonchi or rales.   Abdominal:      General: Bowel sounds are normal. There is no distension.      Palpations: Abdomen is soft.      Tenderness: There is no abdominal tenderness.   Musculoskeletal:      Cervical back: Neck supple.      Right lower leg: No edema.      Left lower leg: No edema.   Lymphadenopathy:      Cervical: No cervical adenopathy.   Skin:     General: Skin is warm.      Capillary Refill: Capillary refill takes less than 2 seconds.      Findings: No rash.   Neurological:      Mental Status: He is alert. Mental status is at baseline.           Cynthia Fraser PA-C  [facility]  2/10/2025 9:31 AM    "

## 2025-03-10 LAB
LEFT EYE DIABETIC RETINOPATHY: NORMAL
RIGHT EYE DIABETIC RETINOPATHY: NORMAL

## 2025-05-28 ENCOUNTER — TELEPHONE (OUTPATIENT)
Age: 40
End: 2025-05-28

## 2025-07-12 ENCOUNTER — APPOINTMENT (OUTPATIENT)
Age: 40
End: 2025-07-12
Payer: COMMERCIAL

## 2025-07-12 DIAGNOSIS — E10.9 TYPE 1 DIABETES MELLITUS WITHOUT COMPLICATION (HCC): ICD-10-CM

## 2025-07-12 LAB
25(OH)D3 SERPL-MCNC: 25.9 NG/ML (ref 30–100)
ANION GAP SERPL CALCULATED.3IONS-SCNC: 10 MMOL/L (ref 4–13)
BUN SERPL-MCNC: 14 MG/DL (ref 5–25)
CALCIUM SERPL-MCNC: 9 MG/DL (ref 8.4–10.2)
CHLORIDE SERPL-SCNC: 105 MMOL/L (ref 96–108)
CO2 SERPL-SCNC: 26 MMOL/L (ref 21–32)
CREAT SERPL-MCNC: 0.72 MG/DL (ref 0.6–1.3)
EST. AVERAGE GLUCOSE BLD GHB EST-MCNC: 203 MG/DL
GFR SERPL CREATININE-BSD FRML MDRD: 117 ML/MIN/1.73SQ M
GLUCOSE P FAST SERPL-MCNC: 42 MG/DL (ref 65–99)
HBA1C MFR BLD: 8.7 %
POTASSIUM SERPL-SCNC: 3.9 MMOL/L (ref 3.5–5.3)
SODIUM SERPL-SCNC: 141 MMOL/L (ref 135–147)
TSH SERPL DL<=0.05 MIU/L-ACNC: 1.02 UIU/ML (ref 0.45–4.5)

## 2025-07-12 PROCEDURE — 82306 VITAMIN D 25 HYDROXY: CPT

## 2025-07-12 PROCEDURE — 36415 COLL VENOUS BLD VENIPUNCTURE: CPT

## 2025-07-12 PROCEDURE — 83036 HEMOGLOBIN GLYCOSYLATED A1C: CPT

## 2025-07-12 PROCEDURE — 84443 ASSAY THYROID STIM HORMONE: CPT

## 2025-07-12 PROCEDURE — 80048 BASIC METABOLIC PNL TOTAL CA: CPT

## 2025-07-14 ENCOUNTER — OFFICE VISIT (OUTPATIENT)
Dept: FAMILY MEDICINE CLINIC | Facility: CLINIC | Age: 40
End: 2025-07-14
Payer: COMMERCIAL

## 2025-07-14 VITALS
WEIGHT: 255 LBS | OXYGEN SATURATION: 97 % | HEART RATE: 70 BPM | DIASTOLIC BLOOD PRESSURE: 78 MMHG | SYSTOLIC BLOOD PRESSURE: 118 MMHG | BODY MASS INDEX: 31.71 KG/M2 | TEMPERATURE: 97.7 F | HEIGHT: 75 IN

## 2025-07-14 DIAGNOSIS — F41.1 GENERALIZED ANXIETY DISORDER: ICD-10-CM

## 2025-07-14 DIAGNOSIS — I10 PRIMARY HYPERTENSION: ICD-10-CM

## 2025-07-14 DIAGNOSIS — E10.22 TYPE 1 DM WITH CKD STAGE 1 AND HYPERTENSION  (HCC): ICD-10-CM

## 2025-07-14 DIAGNOSIS — I12.9 TYPE 1 DM WITH CKD STAGE 1 AND HYPERTENSION  (HCC): ICD-10-CM

## 2025-07-14 DIAGNOSIS — N18.1 TYPE 1 DM WITH CKD STAGE 1 AND HYPERTENSION  (HCC): Primary | ICD-10-CM

## 2025-07-14 DIAGNOSIS — I12.9 TYPE 1 DM WITH CKD STAGE 1 AND HYPERTENSION  (HCC): Primary | ICD-10-CM

## 2025-07-14 DIAGNOSIS — N18.1 TYPE 1 DM WITH CKD STAGE 1 AND HYPERTENSION  (HCC): ICD-10-CM

## 2025-07-14 DIAGNOSIS — E10.22 TYPE 1 DM WITH CKD STAGE 1 AND HYPERTENSION  (HCC): Primary | ICD-10-CM

## 2025-07-14 DIAGNOSIS — E55.9 VITAMIN D INSUFFICIENCY: ICD-10-CM

## 2025-07-14 PROCEDURE — 99214 OFFICE O/P EST MOD 30 MIN: CPT

## 2025-07-14 RX ORDER — OMEGA-3S/DHA/EPA/FISH OIL/D3 300MG-1000
400 CAPSULE ORAL DAILY
Qty: 90 TABLET | Refills: 1 | Status: SHIPPED | OUTPATIENT
Start: 2025-07-14

## 2025-07-14 NOTE — ASSESSMENT & PLAN NOTE
Lab Results   Component Value Date    HGBA1C 8.7 (H) 07/12/2025   -Following with endo  -A1C improved from 8.8 to 8.7, not not yet at goal.   -Notes poor diet only at lunch time, notes fast food for most lunches. Breakfast and dinner are usally home cooked    -Referral placed to f/u with clin pharm   -Pt requesting Contour Next test strips as this is what insurance says they will cover. Notes he did not tolerate metformin previously     Orders:    Ambulatory referral to clinical pharmacy; Future    glucose blood (Contour Next Test) test strip; Use as instructed

## 2025-07-16 ENCOUNTER — TELEMEDICINE (OUTPATIENT)
Dept: FAMILY MEDICINE CLINIC | Facility: CLINIC | Age: 40
End: 2025-07-16

## 2025-07-16 ENCOUNTER — TELEPHONE (OUTPATIENT)
Dept: FAMILY MEDICINE CLINIC | Facility: CLINIC | Age: 40
End: 2025-07-16

## 2025-07-16 DIAGNOSIS — E10.22 TYPE 1 DM WITH CKD STAGE 1 AND HYPERTENSION  (HCC): Primary | ICD-10-CM

## 2025-07-16 DIAGNOSIS — E10.9 TYPE 1 DIABETES MELLITUS WITHOUT COMPLICATION (HCC): ICD-10-CM

## 2025-07-16 DIAGNOSIS — I12.9 TYPE 1 DM WITH CKD STAGE 1 AND HYPERTENSION  (HCC): Primary | ICD-10-CM

## 2025-07-16 DIAGNOSIS — N18.1 TYPE 1 DM WITH CKD STAGE 1 AND HYPERTENSION  (HCC): Primary | ICD-10-CM

## 2025-07-16 RX ORDER — INSULIN ASPART 100 [IU]/ML
3 INJECTION, SOLUTION INTRAVENOUS; SUBCUTANEOUS 2 TIMES DAILY WITH MEALS
Qty: 15 ML | Refills: 1 | Status: SHIPPED | OUTPATIENT
Start: 2025-07-16

## 2025-07-16 RX ORDER — BIOTIN 1 MG
TABLET ORAL
Refills: 0 | OUTPATIENT
Start: 2025-07-16

## 2025-07-16 RX ORDER — INSULIN ASPART 100 [IU]/ML
INJECTION, SOLUTION INTRAVENOUS; SUBCUTANEOUS
Qty: 120 ML | Refills: 1 | Status: SHIPPED | OUTPATIENT
Start: 2025-07-16

## 2025-07-16 NOTE — TELEPHONE ENCOUNTER
PA for glucose blood (Contour Next Test) test strip SUBMITTED to     via    [x]CM-KEY: [id number]  []Surescripts-Case ID #   []Availity-Auth ID # NDC #   []Faxed to plan   []Other website   []Phone call Case ID #     [x]PA sent as URGENT    All office notes, labs and other pertaining documents and studies sent. Clinical questions answered. Awaiting determination from insurance company.     Turnaround time for your insurance to make a decision on your Prior Authorization can take 7-21 business days.

## 2025-07-16 NOTE — PROGRESS NOTES
[facility] Clinical Pharmacy Services  Aysha Story    Administrative Statements   Encounter provider Aysha Story    The Patient is located at Home and in the following state in which I hold an active license [place].    The patient was identified by name and date of birth. Leeroy Corral was informed that this is a telemedicine visit and that the visit is being conducted through the Epic Embedded platform. He agrees to proceed..  My office door was closed. No one else was in the room.  He acknowledged consent and understanding of privacy and security of the video platform. The patient has agreed to participate and understands they can discontinue the visit at any time.      Assessment/ Plan     Assessment & Plan  Type 1 diabetes mellitus without complication (HCC)    Lab Results   Component Value Date    HGBA1C 8.7 (H) 07/12/2025     Continue follow up with endocrinology for management of insulin pump.  Patient needs refills on his insulin and supplies before he can get into see the endocrinologist.  Assisted with refills on NovoLog insulin pen, NovoLog insulin vial for use with pump, Accu-Chek guide test strips, glucagon kit.     Accucheck Guide test strips  needs prior auth.  This is the preferred device since it integrates with his insulin pump.  I will reach out to the prior Auth team to see if we can have this completed  Orders:    Glucagon 1 MG/0.2ML SOAJ; Inject 1 mg under the skin if needed (For severe hypoglycemia event)    insulin aspart (NovoLOG FlexPen) 100 UNIT/ML injection pen; Inject 3 Units under the skin 2 (two) times a day with meals Or if pump fails    Insulin Aspart (NovoLOG) 100 units/mL injection; For use with insulin pump.  Max daily dose 125 units.    glucose blood (Accu-Chek Guide) test strip; Use to test blood sugar up to 4 times daily. Accu-chek Guide test strips      Follow-up: As needed with clinical pharmacist; appointment in September with  endocrinology    Clinical Pharmacist added to Care Team    Subjective   Appointment today is for type 1 diabetes management. Diagnosed 9 years old. There is a family history of both Type 1 and Type 2. He does have an insulin pump and follows with endocrinology.  CGM Device: Guardian 4 mini [employer] Integrates with med[employer]onic 780 G.His old version was out of warranty. He did set up new pump settings and it is working without issues. He was having difficulty obtaining CGM sensors but this has resolved and now has been coming monthly.     Pump Settings:  Basal rates:  Total 28 U/ day  12- 5 AM 3.3 u/hr  5 AM - 11 AM 3.5 u/hr  11 AM - 12 A, 3.5 u/hr    Tightened carb ratio based on total daily dose.  a. 0000:  4   b. 0600:- 3.5  d. 1600: 4 grams  Continue ISF 1: 10 tighter control     Currently the humidity humidity and sweating sweating is causing issues with insulin pump site adhesion.  He has used both over patches and skin TAC.  They are falling off 1-2 x /week during of the summer.  This improves in the fall and winter.  He does have additional pump supplies and can usually get a new pump installed within the next several hours.  During that time he will utilize insulin pen injections.    Hypoglycemia event 1-2 times per week. He does have glucose tablets, fruit snacks, juice. Recheck then will get food.  He does not currently have a glucagon kit for emergency use.            Medication Adherence/ Tolerability/ Cost:  Accu-Chek Guide Strp  atorvastatin  cholecalciferol  Contour Next Test Strp  Insulin Aspart  lisinopril  sertraline      2. Lifestyle:   Diet Recall:   Diet has been more home cooking. More vegetables, More salads. Lunch is usually maintenance so in between locations. Might get [employer] with fries, with chicken nuggets.          Objective     Time in target (2 days of data )   In range: 63%   Above 36%    Below 1 %    ASCVD Risk:  The ASCVD Risk score (Jet DK, et al., 2019) failed to calculate for  the following reasons:    The 2019 ASCVD risk score is only valid for ages 40 to 79     Vitals:  There were no vitals filed for this visit.    Eye Exam:    Lab Results   Component Value Date    LEFTDIABRET None 03/10/2025    RIGHTDIABRET None 03/10/2025       Labs:  Lab Results   Component Value Date    SODIUM 141 07/12/2025    K 3.9 07/12/2025    EGFR 117 07/12/2025    CREATININE 0.72 07/12/2025    GLUF 42 (LL) 07/12/2025    MICROALBCRE 5 11/11/2024       Lab Results   Component Value Date    HGBA1C 8.7 (H) 07/12/2025    HGBA1C 8.8 (H) 01/29/2025    HGBA1C 8.3 (A) 11/11/2024         Pharmacist Tracking Tool     Pharmacist Tracking Tool  Reason For Outreach: Embedded Pharmacist  Demographics:  Intervention Method: Video  Type of Intervention: New  Topics Addressed: Diabetes  Pharmacologic Interventions: Med Rec  Non-Pharmacologic Interventions: Care coordination, Disease state education, Home Monitoring, and Medication/Device education  Time:  Direct Patient Care: 35 mins  Care Coordination: 10 mins  Recommendation Recipient: Patient/Caregiver and Provider  Outcome: Accepted

## 2025-07-16 NOTE — TELEPHONE ENCOUNTER
Spoke with patient. He does not need prior auth on Contour next test strips. He needs prior auth on Accu-chek guide test strips. He is type 1 diabetic and the Accuchek guide integrates with his pump whereas the formulary preferred true metrix does not. Can you please initiate a prior auth for the Accu-chek guide strips?

## 2025-07-17 ENCOUNTER — TELEPHONE (OUTPATIENT)
Age: 40
End: 2025-07-17

## 2025-07-17 NOTE — TELEPHONE ENCOUNTER
PA for glucose blood (Accu-Chek Guide) test strip SUBMITTED to     via    [x]CM-KEY: [id number]  []Surescripts-Case ID #   []Availity-Auth ID # NDC #   []Faxed to plan   []Other website   []Phone call Case ID #     [x]PA sent as URGENT    All office notes, labs and other pertaining documents and studies sent. Clinical questions answered. Awaiting determination from insurance company.     Turnaround time for your insurance to make a decision on your Prior Authorization can take 7-21 business days.

## 2025-07-17 NOTE — TELEPHONE ENCOUNTER
I am starting a new telephone encounter for the Accu-Chek as it is a different prescription that requires PA.

## 2025-07-18 RX ORDER — BLOOD SUGAR DIAGNOSTIC
STRIP MISCELLANEOUS
Qty: 400 STRIP | Refills: 1 | OUTPATIENT
Start: 2025-07-18

## 2025-07-18 NOTE — TELEPHONE ENCOUNTER
Kobi from [employer] asked for the document to be faxed to a different number   Please resend to [phone]

## 2025-07-18 NOTE — TELEPHONE ENCOUNTER
PA for glucose blood (Accu-Chek Guide) test strip  APPROVED     Date(s) approved 5/17/25 - 7/16/26    Case #[id number]    Patient advised by          []MyChart Message  []Phone call   []LMOM  []L/M to call office as no active Communication consent on file  []Unable to leave detailed message as VM not approved on Communication consent       Pharmacy advised by    [x]Fax  []Phone call  []Secure Chat       Approval letter scanned into Media Yes

## 2025-07-28 ENCOUNTER — TELEPHONE (OUTPATIENT)
Dept: FAMILY MEDICINE CLINIC | Facility: CLINIC | Age: 40
End: 2025-07-28